# Patient Record
Sex: FEMALE | Race: WHITE | NOT HISPANIC OR LATINO
[De-identification: names, ages, dates, MRNs, and addresses within clinical notes are randomized per-mention and may not be internally consistent; named-entity substitution may affect disease eponyms.]

---

## 2023-02-07 ENCOUNTER — APPOINTMENT (OUTPATIENT)
Dept: ORTHOPEDIC SURGERY | Facility: CLINIC | Age: 53
End: 2023-02-07
Payer: COMMERCIAL

## 2023-02-07 VITALS — WEIGHT: 150 LBS | BODY MASS INDEX: 25.61 KG/M2 | HEIGHT: 64 IN

## 2023-02-07 DIAGNOSIS — E78.00 PURE HYPERCHOLESTEROLEMIA, UNSPECIFIED: ICD-10-CM

## 2023-02-07 PROCEDURE — 73010 X-RAY EXAM OF SHOULDER BLADE: CPT | Mod: RT

## 2023-02-07 PROCEDURE — 73030 X-RAY EXAM OF SHOULDER: CPT | Mod: RT

## 2023-02-07 PROCEDURE — 99204 OFFICE O/P NEW MOD 45 MIN: CPT

## 2023-02-07 RX ORDER — DICLOFENAC SODIUM 75 MG/1
75 TABLET, DELAYED RELEASE ORAL TWICE DAILY
Qty: 60 | Refills: 3 | Status: COMPLETED | COMMUNITY
Start: 2023-02-07 | End: 2023-06-07

## 2023-02-07 NOTE — ASSESSMENT
[FreeTextEntry1] : R RC tendonitis/impingement.\par Activity modification.\par Ice.\par Trial of PT.\par Diclofenac BID prn.\par Consider SA injection vs MRI.\par RTO 6 weeks.\par

## 2023-02-07 NOTE — PHYSICAL EXAM
[Right] : right shoulder [4 ___] : forward flexion 4[unfilled]/5 [5___] : internal rotation 5[unfilled]/5 [] : no ecchymosis [FreeTextEntry9] : \par ER 50

## 2023-02-07 NOTE — HISTORY OF PRESENT ILLNESS
[7] : 7 [0] : 0 [Sharp] : sharp [Meds] : meds [Ice] : ice [Lying in bed] : lying in bed [Retired] : Work status: retired [de-identified] : 2/7/23: 54 yo RHD female presenting with right shoulder pain since Nov 2022. Denies specific injury but she believes pain may have started from sleeping wrong or walking her dog. There is pain deep in her shoulder. She has been doing HEP for stretching. Takes advil and uses ice for relief. [] : no [FreeTextEntry1] : right shoulder [FreeTextEntry9] : advil

## 2023-02-14 ENCOUNTER — APPOINTMENT (OUTPATIENT)
Dept: ORTHOPEDIC SURGERY | Facility: CLINIC | Age: 53
End: 2023-02-14
Payer: COMMERCIAL

## 2023-02-14 VITALS — BODY MASS INDEX: 25.61 KG/M2 | HEIGHT: 64 IN | WEIGHT: 150 LBS

## 2023-02-14 PROCEDURE — 20611 DRAIN/INJ JOINT/BURSA W/US: CPT | Mod: RT

## 2023-02-14 PROCEDURE — J3490M: CUSTOM | Mod: RT

## 2023-02-14 PROCEDURE — 99213 OFFICE O/P EST LOW 20 MIN: CPT | Mod: 25

## 2023-02-14 NOTE — ASSESSMENT
[FreeTextEntry1] : R RC tendonitis/impingement.\par Activity modification.\par Ice.\par She will start PT. \par Diclofenac BID prn.\par R SA injection.\par Consider MRI.\par RTO 6 weeks.\par \par Procedure Note:\par Large Joint Injection was performed because of pain and inflammation, failure of conservative treatment.  \par Medications:\par Depo-Medrol: 1 cc, 80 mg.\par Lidocaine: 2 cc, 1%. \par Marcaine: 2 cc, .25%. \par \par Medication was injected in the right subacromial space. Patient has tried OTC's including aspirin, Ibuprofen, Aleve etc or prescription NSAIDs, and/or exercises at home and/ or physical therapy without satisfactory response. The risks, benefits, and alternatives to cortisone injection were explained in full to the patient. Risks outlined include but are not limited to infection, sepsis, bleeding, scarring, skin discoloration, temporary increase in pain, syncopal episode, failure to resolve symptoms, allergic reaction, symptom recurrence, and elevation of blood sugar in diabetics. Patient understood the risks. All questions were answered. After discussion of options, patient requested an injection. Oral informed consent was obtained and sterile prep of the injection site was performed using alcohol. Sterile technique was utilized for the procedure including the preparation of the solutions used for the injection. Ethyl chloride spray was used topically.  Sterile technique used. Patient tolerated procedure well. Post Procedure Instructions: Patient was advised to call if redness, pain, or fever occur and apply ice for 15 min. out of every hour for the next 12-24 hours as tolerated. patient was advised to rest the joint(s) for 2 days.\par \par Ultrasound Guidance was used for the following reasons: for prior failure or difficult injection and to visualize tearing and inflammation.\par Ultrasound guided injection was performed of the shoulder, visualization of the needle and placement of injection was performed without complication.\par \par

## 2023-02-14 NOTE — HISTORY OF PRESENT ILLNESS
[7] : 7 [0] : 0 [Sharp] : sharp [Meds] : meds [Ice] : ice [Lying in bed] : lying in bed [Retired] : Work status: retired [de-identified] : 2/14/23:  Here for follow up.  She has improved during the day but continues to have pain at night.  She hasn’t started PT yet.  \par \par 2/7/23: 52 yo RHD female presenting with right shoulder pain since Nov 2022. Denies specific injury but she believes pain may have started from sleeping wrong or walking her dog. There is pain deep in her shoulder. She has been doing HEP for stretching. Takes advil and uses ice for relief. [] : no [FreeTextEntry1] : right shoulder [FreeTextEntry5] : patient would like a cortisone injection [FreeTextEntry9] : advil

## 2023-02-28 ENCOUNTER — APPOINTMENT (OUTPATIENT)
Dept: ORTHOPEDIC SURGERY | Facility: CLINIC | Age: 53
End: 2023-02-28
Payer: COMMERCIAL

## 2023-02-28 VITALS — HEIGHT: 64 IN | WEIGHT: 150 LBS | BODY MASS INDEX: 25.61 KG/M2

## 2023-02-28 PROCEDURE — 73010 X-RAY EXAM OF SHOULDER BLADE: CPT | Mod: RT

## 2023-02-28 PROCEDURE — 99214 OFFICE O/P EST MOD 30 MIN: CPT

## 2023-02-28 PROCEDURE — 73030 X-RAY EXAM OF SHOULDER: CPT | Mod: RT

## 2023-02-28 NOTE — HISTORY OF PRESENT ILLNESS
[7] : 7 [0] : 0 [Sharp] : sharp [Meds] : meds [Ice] : ice [Lying in bed] : lying in bed [Retired] : Work status: retired [de-identified] : 2/28/23: Here for follow up. She states injection help but she had a new injury and fell onto her shoulder about a week ago. PT wanted her to be cleared before starting.\par \par 2/14/23:  Here for follow up.  She has improved during the day but continues to have pain at night.  She hasn’t started PT yet.  \par \par 2/7/23: 54 yo RHD female presenting with right shoulder pain since Nov 2022. Denies specific injury but she believes pain may have started from sleeping wrong or walking her dog. There is pain deep in her shoulder. She has been doing HEP for stretching. Takes advil and uses ice for relief. [] : no [FreeTextEntry1] : right shoulder [FreeTextEntry5] : patient fell on her right side 10 days ago and now her rt shoulder hurts [FreeTextEntry9] : advil

## 2023-02-28 NOTE — ASSESSMENT
[FreeTextEntry1] : R RC tendonitis/impingement. Shoulder contusion after fall. \par Activity modification.\par Ice.\par She can start PT.\par Diclofenac BID prn.\par R SA injection 2/14/23.\par Consider MRI.\par RTO 6 weeks.\par \par

## 2023-03-21 ENCOUNTER — APPOINTMENT (OUTPATIENT)
Dept: ORTHOPEDIC SURGERY | Facility: CLINIC | Age: 53
End: 2023-03-21

## 2023-04-04 ENCOUNTER — APPOINTMENT (OUTPATIENT)
Dept: ORTHOPEDIC SURGERY | Facility: CLINIC | Age: 53
End: 2023-04-04
Payer: COMMERCIAL

## 2023-04-04 VITALS — BODY MASS INDEX: 25.61 KG/M2 | WEIGHT: 150 LBS | HEIGHT: 64 IN

## 2023-04-04 PROCEDURE — 99213 OFFICE O/P EST LOW 20 MIN: CPT

## 2023-04-04 NOTE — ASSESSMENT
[FreeTextEntry1] : R RC tendonitis/impingement. Shoulder contusion after fall. \par Activity modification.\par Ice.\par Diclofenac BID prn.\par R SA injection 2/14/23.\par She has been in PT, will continue.\par Will get MRI to eval RCT.\par RTO 4-6 weeks.\par

## 2023-04-04 NOTE — HISTORY OF PRESENT ILLNESS
[7] : 7 [0] : 0 [Sharp] : sharp [Meds] : meds [Ice] : ice [Lying in bed] : lying in bed [Retired] : Work status: retired [de-identified] : 4/4/23: Here for follow up. She has been in PT which helps. There is still some pain in her upper arm.\par \par 2/28/23: Here for follow up. She states injection help but she had a new injury and fell onto her shoulder about a week ago. PT wanted her to be cleared before starting.\par \par 2/14/23:  Here for follow up.  She has improved during the day but continues to have pain at night.  She hasn’t started PT yet.  \par \par 2/7/23: 54 yo RHD female presenting with right shoulder pain since Nov 2022. Denies specific injury but she believes pain may have started from sleeping wrong or walking her dog. There is pain deep in her shoulder. She has been doing HEP for stretching. Takes advil and uses ice for relief. [] : no [FreeTextEntry1] : right shoulder [FreeTextEntry5] : patient fell on her right side 10 days ago and now her rt shoulder hurts [FreeTextEntry9] : advil

## 2023-04-07 ENCOUNTER — RESULT REVIEW (OUTPATIENT)
Age: 53
End: 2023-04-07

## 2023-05-16 ENCOUNTER — APPOINTMENT (OUTPATIENT)
Dept: ORTHOPEDIC SURGERY | Facility: CLINIC | Age: 53
End: 2023-05-16

## 2023-05-23 ENCOUNTER — APPOINTMENT (OUTPATIENT)
Dept: ORTHOPEDIC SURGERY | Facility: CLINIC | Age: 53
End: 2023-05-23
Payer: COMMERCIAL

## 2023-05-23 VITALS — HEIGHT: 64 IN | BODY MASS INDEX: 25.61 KG/M2 | WEIGHT: 150 LBS

## 2023-05-23 PROCEDURE — 99214 OFFICE O/P EST MOD 30 MIN: CPT

## 2023-05-23 NOTE — DATA REVIEWED
[MRI] : MRI [Right] : of the right [Shoulder] : shoulder [I independently reviewed and interpreted images and report] : I independently reviewed and interpreted images and report [FreeTextEntry1] : MRI reviewed: PRCT, bursitis

## 2023-05-23 NOTE — HISTORY OF PRESENT ILLNESS
[7] : 7 [0] : 0 [Sharp] : sharp [Meds] : meds [Ice] : ice [Lying in bed] : lying in bed [Retired] : Work status: retired [de-identified] : 5/23/23: RT Shoulder MRI F/U. Pt is starting to feel better today \par \par MRI R shoulder:\par Moderate to severe supraspinatus tendinosis with a small partial-thickness bursal sided tear at the anterior aspect of the insertion. Moderate subscapularis tendinosis with a moderate partial-thickness articular sided tear at the central aspect of the insertion and linear intrasubstance extension proximally. Moderate subacromial/subdeltoid bursitis.\par \par 4/4/23: Here for follow up. She has been in PT which helps. There is still some pain in her upper arm.\par \par 2/28/23: Here for follow up. She states injection help but she had a new injury and fell onto her shoulder about a week ago. PT wanted her to be cleared before starting.\par \par 2/14/23:  Here for follow up.  She has improved during the day but continues to have pain at night.  She hasn’t started PT yet.  \par \par 2/7/23: 52 yo RHD female presenting with right shoulder pain since Nov 2022. Denies specific injury but she believes pain may have started from sleeping wrong or walking her dog. There is pain deep in her shoulder. She has been doing HEP for stretching. Takes advil and uses ice for relief. [] : no [FreeTextEntry1] : right shoulder [FreeTextEntry5] : patient fell on her right side 10 days ago and now her rt shoulder hurts [FreeTextEntry9] : advil

## 2023-05-23 NOTE — PHYSICAL EXAM
[Right] : right shoulder [4 ___] : forward flexion 4[unfilled]/5 [5___] : internal rotation 5[unfilled]/5 [] : positive impingement testing [FreeTextEntry9] : \par ER 50

## 2023-05-23 NOTE — ASSESSMENT
[FreeTextEntry1] : R RC tendonitis/impingement. Shoulder contusion after fall. \par MRI reviewed: PRCT, bursitis\par Diclofenac BID prn.\par R SA injection 2/14/23.\par Continue PT, she is improved.\par RTO 4-6 weeks.\par

## 2023-07-18 ENCOUNTER — APPOINTMENT (OUTPATIENT)
Dept: ORTHOPEDIC SURGERY | Facility: CLINIC | Age: 53
End: 2023-07-18
Payer: COMMERCIAL

## 2023-07-18 VITALS — BODY MASS INDEX: 25.61 KG/M2 | WEIGHT: 150 LBS | HEIGHT: 64 IN

## 2023-07-18 DIAGNOSIS — M75.81 OTHER SHOULDER LESIONS, RIGHT SHOULDER: ICD-10-CM

## 2023-07-18 DIAGNOSIS — M75.51 BURSITIS OF RIGHT SHOULDER: ICD-10-CM

## 2023-07-18 DIAGNOSIS — M75.111 INCOMPLETE ROTATOR CUFF TEAR OR RUPTURE OF RIGHT SHOULDER, NOT SPECIFIED AS TRAUMATIC: ICD-10-CM

## 2023-07-18 PROCEDURE — J3490M: CUSTOM

## 2023-07-18 PROCEDURE — 99213 OFFICE O/P EST LOW 20 MIN: CPT | Mod: 25

## 2023-07-18 PROCEDURE — 20610 DRAIN/INJ JOINT/BURSA W/O US: CPT | Mod: RT

## 2023-07-18 NOTE — ASSESSMENT
[FreeTextEntry1] : R RC tendonitis/impingement. Shoulder contusion after fall. \par MRI reviewed: PRCT, bursitis\par Trial of acupuncture.\par Will try Diclofenac BID prn. Aware she cannot take Advil while taking this med.\par R SA injection 2/14/23 with good response.\par Repeat SA injection given today. Post injection instructions.  \par Continue PT, she is improved.\par Discussed surgery is an option, she has not been able to resume full activity.\par RTO 4-6 weeks.\par \par \par Procedure Note:\par Large Joint Injection was performed because of pain and inflammation, failure of conservative treatment.  \par Medications:\par Depo-Medrol: 1 cc, 80 mg.\par Lidocaine: 2 cc, 1%. \par Marcaine: 2 cc, .25%. \par \par Medication was injected in the right subacromial space. Patient has tried OTC's including aspirin, Ibuprofen, Aleve etc or prescription NSAIDs, and/or exercises at home and/ or physical therapy without satisfactory response. The risks, benefits, and alternatives to cortisone injection were explained in full to the patient. Risks outlined include but are not limited to infection, sepsis, bleeding, scarring, skin discoloration, temporary increase in pain, syncopal episode, failure to resolve symptoms, allergic reaction, symptom recurrence, and elevation of blood sugar in diabetics. Patient understood the risks. All questions were answered. After discussion of options, patient requested an injection. Oral informed consent was obtained and sterile prep of the injection site was performed using alcohol. Sterile technique was utilized for the procedure including the preparation of the solutions used for the injection. Ethyl chloride spray was used topically.  Sterile technique used. Patient tolerated procedure well. Post Procedure Instructions: Patient was advised to call if redness, pain, or fever occur and apply ice for 15 min. out of every hour for the next 12-24 hours as tolerated. patient was advised to rest the joint(s) for 2 days.\par

## 2023-07-18 NOTE — HISTORY OF PRESENT ILLNESS
[7] : 7 [0] : 0 [Sharp] : sharp [Meds] : meds [Ice] : ice [Lying in bed] : lying in bed [Retired] : Work status: retired [de-identified] : 7/18/23: Here for follow up.  She has had some improvement with PT.  Pain lateral arm. She takes Advil for TMJ so she has not tried the diclofenac.\par \par 5/23/23: RT Shoulder MRI F/U. Pt is starting to feel better today \par \par MRI R shoulder:\par Moderate to severe supraspinatus tendinosis with a small partial-thickness bursal sided tear at the anterior aspect of the insertion. Moderate subscapularis tendinosis with a moderate partial-thickness articular sided tear at the central aspect of the insertion and linear intrasubstance extension proximally. Moderate subacromial/subdeltoid bursitis.\par \par 4/4/23: Here for follow up. She has been in PT which helps. There is still some pain in her upper arm.\par \par 2/28/23: Here for follow up. She states injection help but she had a new injury and fell onto her shoulder about a week ago. PT wanted her to be cleared before starting.\par \par 2/14/23:  Here for follow up.  She has improved during the day but continues to have pain at night.  She hasn’t started PT yet.  \par \par 2/7/23: 52 yo RHD female presenting with right shoulder pain since Nov 2022. Denies specific injury but she believes pain may have started from sleeping wrong or walking her dog. There is pain deep in her shoulder. She has been doing HEP for stretching. Takes advil and uses ice for relief. [] : no [FreeTextEntry1] : right shoulder [FreeTextEntry5] : patient fell on her right side 10 days ago and now her rt shoulder hurts [FreeTextEntry9] : advil

## 2023-09-12 ENCOUNTER — APPOINTMENT (OUTPATIENT)
Dept: ORTHOPEDIC SURGERY | Facility: CLINIC | Age: 53
End: 2023-09-12